# Patient Record
Sex: MALE | URBAN - METROPOLITAN AREA
[De-identification: names, ages, dates, MRNs, and addresses within clinical notes are randomized per-mention and may not be internally consistent; named-entity substitution may affect disease eponyms.]

---

## 2018-03-09 ENCOUNTER — OFFICE VISIT (OUTPATIENT)
Dept: INTERNAL MEDICINE CLINIC | Age: 23
End: 2018-03-09

## 2018-03-09 VITALS
DIASTOLIC BLOOD PRESSURE: 78 MMHG | BODY MASS INDEX: 21.13 KG/M2 | HEART RATE: 52 BPM | HEIGHT: 72 IN | SYSTOLIC BLOOD PRESSURE: 135 MMHG | WEIGHT: 156 LBS | TEMPERATURE: 98.1 F | RESPIRATION RATE: 18 BRPM | OXYGEN SATURATION: 98 %

## 2018-03-09 DIAGNOSIS — Z02.89 HISTORY AND PHYSICAL EXAMINATION, IMMIGRATION: Primary | ICD-10-CM

## 2018-03-09 NOTE — MR AVS SNAPSHOT
[address] [facility] [address] [facility] [address] 
[phone] Patient: Griselda Vargas MRN: [id number] :1995 Visit Information Date & Time Provider Department Dept. Phone Encounter #  
 3/9/2018  1:45 PM Jolynn iMreles MD [facility] [id number] Follow-up Instructions Return for we will call you when labs are back. Upcoming Health Maintenance Date Due DTaP/Tdap/Td series (1 - Tdap) 2014 Influenza Age 5 to Adult 2017 Allergies as of 3/9/2018  Review Complete On: 3/9/2018 By: Jolynn Mireles MD  
 No Known Allergies Current Immunizations  Never Reviewed No immunizations on file. Not reviewed this visit You Were Diagnosed With   
  
 Codes Comments History and physical examination, immigration    -  Primary ICD-10-CM: Z02.89 ICD-9-CM: V70.3 Vitals BP Pulse Temp Resp Height(growth percentile) Weight(growth percentile) 135/78 (BP 1 Location: Left arm, BP Patient Position: Sitting) (!) 52 98.1 °F (36.7 °C) (Oral) 18 6' (1.829 m) 156 lb (70.8 kg) SpO2 BMI Smoking Status 98% 21.16 kg/m2 Never Smoker BMI and BSA Data Body Mass Index Body Surface Area  
 21.16 kg/m 2 1.9 m 2 Your Updated Medication List  
  
Notice  As of 3/9/2018  2:23 PM  
 You have not been prescribed any medications. We Performed the Following N GONORRHOEA AMPLIFICATION [id number] CPT(R)] QUANTIFERON TB GOLD(CLIENT INCUB.) [id number] CPT(R)] RPR [75255 CPT(R)] Follow-up Instructions Return for we will call you when labs are back. To-Do List   
 2018 Lab:  QUANTIFERON TB GOLD(CLIENT INCUB.)   
  
 2018 Lab:  RPR Patient Instructions We will call you when the lab results come back.  
If there is nothing else that needs to be done, we will tell you when to come to our office to  your sealed paperwork. Introducing [facility]! [employer] introduces [employer] patient portal. Now you can access parts of your medical record, email your doctor's office, and request medication refills online. 1. In your internet browser, go to [email or web address] 2. Click on the First Time User? Click Here link in the Sign In box. You will see the New Member Sign Up page. 3. Enter your [employer] Access Code exactly as it appears below. You will not need to use this code after youve completed the sign-up process. If you do not sign up before the expiration date, you must request a new code. · [employer] Access Code: [email or web address] Expires: 6/7/2018  1:55 PM 
 
4. Enter the last four digits of your Social Security Number (xxxx) and Date of Birth (mm/dd/yyyy) as indicated and click Submit. You will be taken to the next sign-up page. 5. Create a [employer] ID. This will be your [employer] login ID and cannot be changed, so think of one that is secure and easy to remember. 6. Create a [employer] password. You can change your password at any time. 7. Enter your Password Reset Question and Answer. This can be used at a later time if you forget your password. 8. Enter your e-mail address. You will receive e-mail notification when new information is available in [address]. 9. Click Sign Up. You can now view and download portions of your medical record. 10. Click the Download Summary menu link to download a portable copy of your medical information. If you have questions, please visit the Frequently Asked Questions section of the [employer] website. Remember, [employer] is NOT to be used for urgent needs. For medical emergencies, dial 911. Now available from your iPhone and Android! Please provide this summary of care documentation to your next provider. If you have any questions after today's visit, please call [phone].

## 2018-03-09 NOTE — PROGRESS NOTES
Immigration Physical  History:   Sachin Kenney is a 23 y.o. male presenting today for an initial visit for immigration physical.      1.  TB history/symptoms:    Patient denies history of active TB and denies exposure to anyone with active TB   History positive TST (PPD) or positive quantiferon? No    2. Syphilis/gonorrhea/STD exposure/history/symptoms:  Patient denies exposure to STDs including syphilis and gonorrhea. 3.  Leprosy exposure/history/symptoms:  Patient denies anesthetic, hypo or hyperpigmented skin patches, sensory loss in fingers and toes, painless wounds. he also denies history of or exposure to leprosy as well as family history of skin lesions or leprosy. 4.  Mental disorders:  Patient denies depression, bipolar disorder, or schizophrenia. he denies suicidal or homicidal ideations or other harmful behavior. 5.  Substance use disorders:  Patient denies current or recent illicit use of controlled substances and street drugs. he denies use of alcohol that is causing harmful behavior. 6.  Vaccine history:  Patient has brought vaccine record? yes  Patient has history of chickenpox or has had the chickenpox vaccine? Varicella history    Review of Systems   Constitutional: Negative. Negative for chills, fever, malaise/fatigue and weight loss. HENT: Negative. Eyes: Negative. Respiratory: Negative. Negative for cough, hemoptysis, sputum production and shortness of breath. Cardiovascular: Negative. Negative for chest pain. Gastrointestinal: Negative. Negative for abdominal pain. Genitourinary: Negative. Negative for hematuria. Denies genital sores, ulcers, discharge   Skin: Negative for rash. Neurological: Negative. Psychiatric/Behavioral: Negative. Negative for depression, hallucinations, substance abuse and suicidal ideas. Denies h/o schizophrenia, bipolar d/o       History reviewed. No pertinent past medical history.     Past Surgical History:   Procedure Laterality Date    HX ANKLE FRACTURE TX      HX ORTHOPAEDIC         Social History     Social History    Marital status:      Spouse name: N/A    Number of children: N/A    Years of education: N/A     Occupational History    Not on file. Social History Main Topics    Smoking status: Never Smoker    Smokeless tobacco: Never Used    Alcohol use Yes      Comment: occass    Drug use: No    Sexual activity: Yes     Partners: Female     Other Topics Concern    Not on file     Social History Narrative    No narrative on file       Family History   Problem Relation Age of Onset    No Known Problems Mother     No Known Problems Father        No current outpatient prescriptions on file prior to visit. No current facility-administered medications on file prior to visit. No Known Allergies    Objective:   VS:    Visit Vitals    /78 (BP 1 Location: Left arm, BP Patient Position: Sitting)    Pulse (!) 52    Temp 98.1 °F (36.7 °C) (Oral)    Resp 18    Ht 6' (1.829 m)    Wt 156 lb (70.8 kg)    SpO2 98%    BMI 21.16 kg/m2     Physical Exam   Constitutional: He is oriented to person, place, and time. He appears well-developed and well-nourished. No distress. HENT:   Head: Normocephalic and atraumatic. Right Ear: External ear normal.   Left Ear: External ear normal.   Nose: Nose normal.   Mouth/Throat: Oropharynx is clear and moist.   Eyes: EOM are normal. Pupils are equal, round, and reactive to light. Neck: Normal range of motion. Neck supple. Carotid bruit is not present. No tracheal deviation present. Cardiovascular: Normal rate, regular rhythm, normal heart sounds and intact distal pulses. Exam reveals no gallop and no friction rub. No murmur heard. Pulmonary/Chest: Effort normal and breath sounds normal. He has no wheezes. He has no rales. Abdominal: Soft. Bowel sounds are normal. He exhibits no distension. There is no tenderness. Musculoskeletal: He exhibits no edema or tenderness. Lymphadenopathy:     He has no cervical adenopathy. Neurological: He is alert and oriented to person, place, and time. Skin: Skin is warm and dry. Psychiatric: He has a normal mood and affect. His behavior is normal.   Nursing note and vitals reviewed. Assessment/ Plan:     Diagnoses and all orders for this visit:    1. History and physical examination, immigration  -     N GONORRHOEA AMPLIFICATION  -     QUANTIFERON TB GOLD(CLIENT INCUB.); Future  -     RPR; Future   All vaccines UTD. Waiting on labs, then done. I have discussed the diagnosis with the patient and the intended plan as seen in the above orders. The patient verbalized understanding and agrees with the plan. Follow-up Disposition:  Return for we will call you when labs are back.     Huber Menard MD

## 2018-03-09 NOTE — PROGRESS NOTES
Chief Complaint   Patient presents with    Physical     Immigration     1. Have you been to the ER, urgent care clinic since your last visit? Hospitalized since your last visit? No    2. Have you seen or consulted any other health care providers outside of the [address] [place] [address] since your last visit? Include any pap smears or colon screening.  No

## 2018-03-14 LAB
ANNOTATION COMMENT IMP: NORMAL
GAMMA INTERFERON BACKGROUND BLD IA-ACNC: 0.04 IU/ML
M TB IFN-G BLD-IMP: NEGATIVE
M TB IFN-G CD4+ BCKGRND COR BLD-ACNC: 0.02 IU/ML
M TB IFN-G CD4+ T-CELLS BLD-ACNC: 0.06 IU/ML
MITOGEN IGNF BLD-ACNC: 7.12 IU/ML
N GONORRHOEA RRNA SPEC QL NAA+PROBE: NEGATIVE
RPR SER QL: NON REACTIVE
SERVICE CMNT-IMP: NORMAL